# Patient Record
Sex: FEMALE | URBAN - METROPOLITAN AREA
[De-identification: names, ages, dates, MRNs, and addresses within clinical notes are randomized per-mention and may not be internally consistent; named-entity substitution may affect disease eponyms.]

---

## 2022-01-26 ENCOUNTER — HOSPITAL ENCOUNTER (OUTPATIENT)
Age: 87
Setting detail: SPECIMEN
Discharge: HOME | End: 2022-01-26
Payer: COMMERCIAL

## 2022-01-26 PROCEDURE — 87086 URINE CULTURE/COLONY COUNT: CPT

## 2022-01-26 PROCEDURE — 81001 URINALYSIS AUTO W/SCOPE: CPT

## 2022-01-27 LAB
AMORPHOUS: ABNORMAL /HPF
BACTERIA: ABNORMAL /HPF
BILIRUBIN URINE: NEGATIVE MG/DL
BLOOD, URINE: NEGATIVE
CLARITY: CLEAR
COLOR: YELLOW
GLUCOSE, URINE: NEGATIVE MG/DL
KETONES, URINE: NEGATIVE MG/DL
LEUKOCYTE ESTERASE, URINE: NEGATIVE
MUCUS: ABNORMAL HPF
NITRITE URINE, QUANTITATIVE: NEGATIVE
PH, URINE: 7.5 (ref 5–8)
PROTEIN UA: NEGATIVE MG/DL
RBC URINE: ABNORMAL /HPF (ref 0–6)
SPECIFIC GRAVITY UA: 1.01 (ref 1–1.03)
SQUAMOUS EPITHELIAL: 4 /HPF
TRICHOMONAS: ABNORMAL /HPF
UROBILINOGEN, URINE: 0.2 MG/DL (ref 0.2–1)
WBC UA: 1 /HPF (ref 0–5)

## 2022-01-28 LAB
CULTURE: NORMAL
Lab: NORMAL
SPECIMEN: NORMAL

## 2022-02-18 ENCOUNTER — HOSPITAL ENCOUNTER (OUTPATIENT)
Age: 87
Setting detail: SPECIMEN
Discharge: HOME | End: 2022-02-18
Payer: MEDICARE

## 2022-02-18 LAB
ANION GAP SERPL CALCULATED.3IONS-SCNC: 10 MMOL/L (ref 4–16)
BUN BLDV-MCNC: 16 MG/DL (ref 6–23)
CALCIUM SERPL-MCNC: 8.7 MG/DL (ref 8.3–10.6)
CHLORIDE BLD-SCNC: 105 MMOL/L (ref 99–110)
CO2: 25 MMOL/L (ref 21–32)
CREAT SERPL-MCNC: 0.7 MG/DL (ref 0.6–1.1)
GFR AFRICAN AMERICAN: >60 ML/MIN/1.73M2
GFR NON-AFRICAN AMERICAN: >60 ML/MIN/1.73M2
GLUCOSE BLD-MCNC: 90 MG/DL (ref 70–99)
HCT VFR BLD CALC: 41.8 % (ref 37–47)
HEMOGLOBIN: 12.4 GM/DL (ref 12.5–16)
MCH RBC QN AUTO: 28.1 PG (ref 27–31)
MCHC RBC AUTO-ENTMCNC: 29.7 % (ref 32–36)
MCV RBC AUTO: 94.8 FL (ref 78–100)
PDW BLD-RTO: 16.3 % (ref 11.7–14.9)
PLATELET # BLD: 144 K/CU MM (ref 140–440)
PMV BLD AUTO: 11.1 FL (ref 7.5–11.1)
POTASSIUM SERPL-SCNC: 4.1 MMOL/L (ref 3.5–5.1)
RBC # BLD: 4.41 M/CU MM (ref 4.2–5.4)
SODIUM BLD-SCNC: 140 MMOL/L (ref 135–145)
TSH HIGH SENSITIVITY: 2.24 UIU/ML (ref 0.27–4.2)
VITAMIN B-12: 476 PG/ML (ref 211–911)
WBC # BLD: 4.5 K/CU MM (ref 4–10.5)

## 2022-02-18 PROCEDURE — 82607 VITAMIN B-12: CPT

## 2022-02-18 PROCEDURE — 36415 COLL VENOUS BLD VENIPUNCTURE: CPT

## 2022-02-18 PROCEDURE — 80048 BASIC METABOLIC PNL TOTAL CA: CPT

## 2022-02-18 PROCEDURE — 84443 ASSAY THYROID STIM HORMONE: CPT

## 2022-02-18 PROCEDURE — 85027 COMPLETE CBC AUTOMATED: CPT

## 2023-04-19 ENCOUNTER — TELEPHONE (OUTPATIENT)
Dept: OTHER | Age: 88
End: 2023-04-19

## 2023-04-19 NOTE — TELEPHONE ENCOUNTER
Patient daughter reached out to see what help she can get for her mother. Granddaughter currently lives with Shahida Fishman but needs some extra help at home caring for her. I spoke with the granddaughter Fredo Middleton [phone] and schedule a home visit for Monday April 24th at 2:00. Unsure of needs at this time.

## 2023-04-24 ENCOUNTER — PARAMEDICINE (OUTPATIENT)
Dept: OTHER | Age: 88
End: 2023-04-24

## 2023-04-24 VITALS — DIASTOLIC BLOOD PRESSURE: 68 MMHG | SYSTOLIC BLOOD PRESSURE: 100 MMHG

## 2023-04-24 NOTE — PROGRESS NOTES
Had a very pleasant home visit today with Gigi and her granddaughter Wolf Duncan. Wolf Duncan lives in the home with Gigi and is her caregiver. They are looking for some help in the evenings when Wolf Duncan is helping her kids get to their sports games. We did discuss hospice. Wolf Duncan stated that would be fine to look in to however she is not the one to make that decision. It would be her mother Arden Sin. She has asked that I call her. We also talked about private pay caregiver. That is also a possibility. I will reach out to Avani's daughter and see she would like for me to set up a meeting with hospice. Patient has never been seen here. Her PCP is in [place]. The only medication she takes is Neurontin and Mirtazipine. She does not have any major health conditions. Within the past couple months she was diagnosed with Pneumonia and COVID. She is now on home oxygen at 2 liters 24/7.

## 2023-04-25 ENCOUNTER — TELEPHONE (OUTPATIENT)
Dept: OTHER | Age: 88
End: 2023-04-25

## 2023-04-25 NOTE — TELEPHONE ENCOUNTER
Spoke with Avani's daughter to see if she would be interested in Hospice or private caregiver. She would like to pursue the private caregiver first. Maybe hospice later. I have reached out to [facility] (caregiver) to see if she has any hours available.

## 2023-05-02 ENCOUNTER — TELEPHONE (OUTPATIENT)
Dept: OTHER | Age: 88
End: 2023-05-02

## 2023-05-02 NOTE — TELEPHONE ENCOUNTER
Received a call from [place] that she had not heard from Jennifer the caregiver. I reached out to Jennifer and left her a message. Jennifer did call her back. Jennifer stated she didn't have a lot of hours open but she had someone that did. I reached out to [place] and someone named Charlotte Martínez is coming to the home today. Will continue to follow.

## 2023-05-11 ENCOUNTER — TELEPHONE (OUTPATIENT)
Dept: OTHER | Age: 88
End: 2023-05-11

## 2023-05-11 NOTE — TELEPHONE ENCOUNTER
Patient's granddaughter Kaitlin Gunter called and needs a caregiver that can stay some overnight's  and weekends. I reached out to [employer]. She is able to stay a couple nights during the week. I reached out to Kaitlin Gunter to advise. She stated she would not need overnight weekdays until July and mainly needed weekends right now. I advised I would keep asking around.

## 2023-05-15 ENCOUNTER — TELEPHONE (OUTPATIENT)
Dept: OTHER | Age: 88
End: 2023-05-15

## 2023-05-15 NOTE — TELEPHONE ENCOUNTER
Patients granddaughter called and would like to talk with Hospice to see what services they can provide. Advised I would call Chad Elaine at [facility].

## 2023-05-16 ENCOUNTER — TELEPHONE (OUTPATIENT)
Dept: OTHER | Age: 88
End: 2023-05-16

## 2023-05-17 ENCOUNTER — TELEPHONE (OUTPATIENT)
Dept: OTHER | Age: 88
End: 2023-05-17

## 2023-05-17 NOTE — TELEPHONE ENCOUNTER
Spoke with Tunde Haskins at [phone]. He will be reaching out to [place] to talk about hospice for [place].

## 2023-05-24 ENCOUNTER — TELEPHONE (OUTPATIENT)
Dept: OTHER | Age: 88
End: 2023-05-24

## 2023-05-24 NOTE — TELEPHONE ENCOUNTER
Reached out to [facility] to see how their meeting went with hospice. No answer. Left message.